# Patient Record
Sex: MALE | Race: WHITE | NOT HISPANIC OR LATINO | Employment: FULL TIME | URBAN - METROPOLITAN AREA
[De-identification: names, ages, dates, MRNs, and addresses within clinical notes are randomized per-mention and may not be internally consistent; named-entity substitution may affect disease eponyms.]

---

## 2020-09-23 ENCOUNTER — HOSPITAL ENCOUNTER (EMERGENCY)
Facility: HOSPITAL | Age: 30
Discharge: HOME | End: 2020-09-23
Attending: EMERGENCY MEDICINE | Admitting: EMERGENCY MEDICINE
Payer: COMMERCIAL

## 2020-09-23 VITALS
HEART RATE: 94 BPM | WEIGHT: 315 LBS | OXYGEN SATURATION: 97 % | DIASTOLIC BLOOD PRESSURE: 70 MMHG | TEMPERATURE: 98.8 F | SYSTOLIC BLOOD PRESSURE: 127 MMHG | RESPIRATION RATE: 20 BRPM

## 2020-09-23 DIAGNOSIS — L03.90 CELLULITIS: ICD-10-CM

## 2020-09-23 DIAGNOSIS — L02.91 ABSCESS: Primary | ICD-10-CM

## 2020-09-23 PROCEDURE — 99283 EMERGENCY DEPT VISIT LOW MDM: CPT

## 2020-09-23 PROCEDURE — 99284 EMERGENCY DEPT VISIT MOD MDM: CPT | Performed by: EMERGENCY MEDICINE

## 2020-09-23 PROCEDURE — 10061 I&D ABSCESS COMP/MULTIPLE: CPT | Performed by: EMERGENCY MEDICINE

## 2020-09-23 RX ORDER — SULFAMETHOXAZOLE AND TRIMETHOPRIM 800; 160 MG/1; MG/1
1 TABLET ORAL 2 TIMES DAILY
Qty: 20 TABLET | Refills: 0 | Status: SHIPPED | OUTPATIENT
Start: 2020-09-23 | End: 2020-10-03

## 2020-09-23 RX ORDER — SULFAMETHOXAZOLE AND TRIMETHOPRIM 800; 160 MG/1; MG/1
1 TABLET ORAL ONCE
Status: COMPLETED | OUTPATIENT
Start: 2020-09-23 | End: 2020-09-23

## 2020-09-23 RX ORDER — CEPHALEXIN 500 MG/1
500 CAPSULE ORAL EVERY 12 HOURS SCHEDULED
Qty: 20 CAPSULE | Refills: 0 | Status: SHIPPED | OUTPATIENT
Start: 2020-09-23 | End: 2020-10-03

## 2020-09-23 RX ORDER — BUPIVACAINE HYDROCHLORIDE 5 MG/ML
30 INJECTION, SOLUTION EPIDURAL; INTRACAUDAL ONCE
Status: COMPLETED | OUTPATIENT
Start: 2020-09-23 | End: 2020-09-23

## 2020-09-23 RX ORDER — CEPHALEXIN 500 MG/1
500 CAPSULE ORAL ONCE
Status: COMPLETED | OUTPATIENT
Start: 2020-09-23 | End: 2020-09-23

## 2020-09-23 RX ORDER — VARENICLINE TARTRATE 1 MG/1
1 TABLET, FILM COATED ORAL 2 TIMES DAILY
COMMUNITY

## 2020-09-23 RX ADMIN — CEPHALEXIN 500 MG: 500 CAPSULE ORAL at 09:43

## 2020-09-23 RX ADMIN — SULFAMETHOXAZOLE AND TRIMETHOPRIM 1 TABLET: 800; 160 TABLET ORAL at 09:43

## 2020-09-23 RX ADMIN — BUPIVACAINE HYDROCHLORIDE 30 ML: 5 INJECTION, SOLUTION EPIDURAL; INTRACAUDAL; PERINEURAL at 09:20

## 2020-09-23 NOTE — ED NOTES
Patient reports he is an alcoholic and is currently staying at a [facility] house for his alcohol abuse   Denies needing other resources for [facility], [facility]  09/23/20 0655

## 2020-09-23 NOTE — ED PROVIDER NOTES
History  Chief Complaint   Patient presents with    Abscess     pt believes he may have an abscess on right inner thigh  per pt it has not opened     Patient is a 58-year-old male presents to the emergency room for complaints of pain on the medial aspect of his right leg  He states there is concern that he has an abscess  States there has been no increasing firmness and bump with redness the last 2-3 days  No fevers or chills  No vomiting diarrhea  No did testicle pain no dysuria frequency  Denies previous history of abscess or IV drug abuse  History provided by:  Patient   used: No        Prior to Admission Medications   Prescriptions Last Dose Informant Patient Reported? Taking?   varenicline (CHANTIX) 1 mg tablet   Yes Yes   Sig: Take 1 mg by mouth 2 (two) times a day      Facility-Administered Medications: None       History reviewed  No pertinent past medical history  History reviewed  No pertinent surgical history  History reviewed  No pertinent family history  I have reviewed and agree with the history as documented  E-Cigarette/Vaping     E-Cigarette/Vaping Substances     Social History     Tobacco Use    Smoking status: Current Every Day Smoker     Packs/day: 0 50     Types: Cigarettes    Smokeless tobacco: Never Used   Substance Use Topics    Alcohol use: Not Currently    Drug use: Not Currently       Review of Systems   Constitutional: Negative  Negative for chills and fever  HENT: Negative  Negative for rhinorrhea, sore throat, trouble swallowing and voice change  Eyes: Negative  Negative for pain and visual disturbance  Respiratory: Negative  Negative for cough, shortness of breath and wheezing  Cardiovascular: Negative  Negative for chest pain and palpitations  Gastrointestinal: Negative for abdominal pain, diarrhea, nausea and vomiting  Genitourinary: Negative  Negative for dysuria and frequency  Musculoskeletal: Negative    Negative for neck pain and neck stiffness  Skin: Positive for wound  Negative for rash  Neurological: Negative  Negative for dizziness, speech difficulty, weakness, light-headedness and numbness  Physical Exam  Physical Exam  Vitals signs and nursing note reviewed  Exam conducted with a chaperone present  Constitutional:       General: He is not in acute distress  Appearance: He is well-developed  HENT:      Head: Normocephalic and atraumatic  Eyes:      Conjunctiva/sclera: Conjunctivae normal       Pupils: Pupils are equal, round, and reactive to light  Neck:      Musculoskeletal: Normal range of motion and neck supple  Trachea: No tracheal deviation  Cardiovascular:      Rate and Rhythm: Normal rate and regular rhythm  Pulmonary:      Effort: Pulmonary effort is normal  No respiratory distress  Breath sounds: Normal breath sounds  No wheezing or rales  Abdominal:      General: Bowel sounds are normal  There is no distension  Palpations: Abdomen is soft  Tenderness: There is no abdominal tenderness  There is no guarding or rebound  Musculoskeletal: Normal range of motion  General: No tenderness or deformity  Skin:     General: Skin is warm and dry  Capillary Refill: Capillary refill takes less than 2 seconds  Findings: Abscess, erythema and wound present  No rash  Neurological:      Mental Status: He is alert and oriented to person, place, and time     Psychiatric:         Behavior: Behavior normal          Vital Signs  ED Triage Vitals [09/23/20 0859]   Temperature Pulse Respirations Blood Pressure SpO2   98 8 °F (37 1 °C) 94 20 127/70 97 %      Temp Source Heart Rate Source Patient Position - Orthostatic VS BP Location FiO2 (%)   Tympanic Monitor Sitting Left arm --      Pain Score       --           Vitals:    09/23/20 0859   BP: 127/70   Pulse: 94   Patient Position - Orthostatic VS: Sitting         Visual Acuity      ED Medications  Medications   bupivacaine (PF) (MARCAINE) 0 5 % injection 30 mL (30 mL Infiltration Given by Other 9/23/20 0920)   sulfamethoxazole-trimethoprim (BACTRIM DS) 800-160 mg per tablet 1 tablet (1 tablet Oral Given 9/23/20 0943)   cephalexin (KEFLEX) capsule 500 mg (500 mg Oral Given 9/23/20 0943)       Diagnostic Studies  Results Reviewed     None                 No orders to display              Procedures  Incision and drain    Date/Time: 9/23/2020 10:10 AM  Performed by: Elwood Denver, DO  Authorized by: Elwood Denver, DO     Patient location:  ED  Other Assisting Provider: No    Consent:     Consent obtained:  Verbal    Consent given by:  Patient and parent    Risks discussed:  Bleeding, incomplete drainage, pain, damage to other organs and infection    Alternatives discussed:  No treatment, delayed treatment and alternative treatment  Universal protocol:     Procedure explained and questions answered to patient or proxy's satisfaction: yes      Relevant documents present and verified: yes      Test results available and properly labeled: yes      Radiology Images displayed and confirmed  If images not available, report reviewed: yes      Required blood products, implants, devices, and special equipment available: yes      Site/side marked: yes      Immediately prior to procedure a time out was called: yes      Patient identity confirmed:  Verbally with patient and arm band  Location:     Type:  Abscess  Pre-procedure details:     Skin preparation:  Chloraprep  Anesthesia (see MAR for exact dosages):      Anesthesia method:  Local infiltration    Local anesthetic:  Bupivacaine 0 5% w/o epi  Procedure details:     Complexity:  Simple    Needle aspiration: no      Incision types:  Stab incision    Scalpel blade:  11    Approach:  Open    Incision depth:  Subcutaneous    Wound management:  Probed and deloculated and irrigated with saline    Drainage:  Bloody and purulent    Drainage amount: Moderate    Wound treatment:  Wound left open    Packing materials:  None  Post-procedure details:     Patient tolerance of procedure: Tolerated well, no immediate complications             ED Course                           SBIRT 20yo+      Most Recent Value   SBIRT (24 yo +)   In order to provide better care to our patients, we are screening all of our patients for alcohol and drug use  Would it be okay to ask you these screening questions? Yes Filed at: 09/23/2020 0944   Initial Alcohol Screen: US AUDIT-C    1  How often do you have a drink containing alcohol? 6 Filed at: 09/23/2020 0944   2  How many drinks containing alcohol do you have on a typical day you are drinking? 6 Filed at: 09/23/2020 0944   3a  Male UNDER 65: How often do you have five or more drinks on one occasion? 6 Filed at: 09/23/2020 0944   Audit-C Score  (!) 18 Filed at: 09/23/2020 0944   Full Alcohol Screen: US AUDIT   4  How often during the last year have you found that you were not able to stop drinking once you had started? 4 Filed at: 09/23/2020 0944   5  How often during past year have you failed to do what was normally expected of you because of drinking? 4 Filed at: 09/23/2020 0944   6  How often in past year have you needed a first drink in the morning to get yourself going after a heavy drinking session? 4 Filed at: 09/23/2020 0944   7  How often in past year have you had feeling of guilt or remorse after drinking? 4 Filed at: 09/23/2020 0944   8  How often in past year have you been unable to remember what happened night before because you had been drinking? 3 Filed at: 09/23/2020 0944   9  Have you or someone else been injured as a result of your drinking? 4 Filed at: 09/23/2020 0944   10  Has a relative, friend, doctor or other health worker been concerned about your drinking and suggested you cut down?    4 Filed at: 09/23/2020 0944   AUDIT Total Score  (!) 45 Filed at: 09/23/2020 6661   BEAN: How many times in the past year have you    Used an illegal drug or used a prescription medication for non-medical reasons? Never Filed at: 09/23/2020 0944                  [facility]  Number of Diagnoses or Management Options  Abscess:   Cellulitis:   Diagnosis management comments: 12-year-old male presenting for concern for abscess of his right lower extremity on the medial aspect  I&D performed at the bedside  Moderate amount of pus drained  Patient's pain improved  Again reviewed and showing patient the bedside bedside ultrasound showing that he has underlying cellulitis  Patient started on antibiotics in the emergency room and given prescription for completion of care  Patient states he is from outside area, he was given follow-up with PCP, he can go to an urgent care, return to the ER for evaluation  Strict return precautions were discussed  Disposition  Final diagnoses:   Abscess   Cellulitis     Time reflects when diagnosis was documented in both MDM as applicable and the Disposition within this note     Time User Action Codes Description Comment    9/23/2020  9:53 AM Nettie Katie Add [L02 91] Abscess     9/23/2020  9:54 AM Nettie Katie Add [L03 90] Cellulitis       ED Disposition     ED Disposition Condition Date/Time Comment    Discharge Stable Wed Sep 23, 2020 [address] discharge to home/self care              Follow-up Information     Follow up With Specialties Details Why Contact Info Additional [address] In 1 week  [address], [address] [facility] [id number]  [address], [address], [address] [place], [place], [address]          Discharge Medication List as of 9/23/2020  9:55 AM      START taking these medications    Details   cephalexin (KEFLEX) 500 mg capsule Take 1 capsule (500 mg total) by mouth every 12 (twelve) hours for 10 days, Starting Wed 9/23/2020, Until Sat 10/3/2020, Print      sulfamethoxazole-trimethoprim (BACTRIM DS) 800-160 mg per tablet Take 1 tablet by mouth 2 (two) times a day for 10 days smx-tmp DS (BACTRIM) 800-160 mg tabs (1tab q12 D10), Starting Wed 9/23/2020, Until Sat 10/3/2020, Print         CONTINUE these medications which have NOT CHANGED    Details   varenicline (CHANTIX) 1 mg tablet Take 1 mg by mouth 2 (two) times a day, Historical Med           No discharge procedures on file      PDMP Review     None          ED Provider  Electronically Signed by           Redd Lemus DO  09/23/20 1017

## 2020-09-23 NOTE — Clinical Note
Martin Allen was seen and treated in our emergency department on 9/23/2020  Diagnosis:     Silvestre Alcantar may return on this date: 09/26/2020         If you have any questions or concerns, please don't hesitate to call        Fazal Guidry DO    ______________________________           _______________          _______________  Hospital Representative                              Date                                Time

## 2021-03-16 ENCOUNTER — HOSPITAL ENCOUNTER (EMERGENCY)
Facility: HOSPITAL | Age: 31
Discharge: HOME | End: 2021-03-16
Attending: EMERGENCY MEDICINE | Admitting: EMERGENCY MEDICINE
Payer: COMMERCIAL

## 2021-03-16 VITALS
HEART RATE: 86 BPM | TEMPERATURE: 97.7 F | SYSTOLIC BLOOD PRESSURE: 158 MMHG | WEIGHT: 315 LBS | RESPIRATION RATE: 14 BRPM | DIASTOLIC BLOOD PRESSURE: 98 MMHG | OXYGEN SATURATION: 98 %

## 2021-03-16 DIAGNOSIS — K08.89 PAIN, DENTAL: Primary | ICD-10-CM

## 2021-03-16 PROCEDURE — 99282 EMERGENCY DEPT VISIT SF MDM: CPT

## 2021-03-16 PROCEDURE — 99283 EMERGENCY DEPT VISIT LOW MDM: CPT | Performed by: PHYSICIAN ASSISTANT

## 2021-03-16 RX ORDER — CLINDAMYCIN HYDROCHLORIDE 300 MG/1
300 CAPSULE ORAL 4 TIMES DAILY
Qty: 40 CAPSULE | Refills: 0 | Status: SHIPPED | OUTPATIENT
Start: 2021-03-16 | End: 2021-03-26

## 2021-03-16 RX ORDER — CLINDAMYCIN HYDROCHLORIDE 150 MG/1
300 CAPSULE ORAL ONCE
Status: COMPLETED | OUTPATIENT
Start: 2021-03-16 | End: 2021-03-16

## 2021-03-16 RX ORDER — ACETAMINOPHEN 325 MG/1
650 TABLET ORAL ONCE
Status: COMPLETED | OUTPATIENT
Start: 2021-03-16 | End: 2021-03-16

## 2021-03-16 RX ORDER — IBUPROFEN 600 MG/1
600 TABLET ORAL EVERY 6 HOURS PRN
Qty: 30 TABLET | Refills: 0 | Status: SHIPPED | OUTPATIENT
Start: 2021-03-16

## 2021-03-16 RX ADMIN — ACETAMINOPHEN 650 MG: 325 TABLET, FILM COATED ORAL at 10:28

## 2021-03-16 RX ADMIN — CLINDAMYCIN HYDROCHLORIDE 300 MG: 150 CAPSULE ORAL at 10:28

## 2021-03-16 NOTE — Clinical Note
David Adilia was seen and treated in our emergency department on 3/16/2021  Diagnosis:     Silvestre    He may return on this date:     Patient is cleared to go back to work        If you have any questions or concerns, please don't hesitate to call        Gonzalo Holcomb DO    ______________________________           _______________          _______________  Hospital Representative                              Date                                Time

## 2021-03-16 NOTE — ED PROVIDER NOTES
History  Chief Complaint   Patient presents with    Dental Pain     mid-front dental pain for 1 week  states he took 1200mg ibuprofen at 0500     Pt wit dental pain for several days           Prior to Admission Medications   Prescriptions Last Dose Informant Patient Reported? Taking?   varenicline (CHANTIX) 1 mg tablet   Yes No   Sig: Take 1 mg by mouth 2 (two) times a day      Facility-Administered Medications: None       History reviewed  No pertinent past medical history  History reviewed  No pertinent surgical history  History reviewed  No pertinent family history  I have reviewed and agree with the history as documented  E-Cigarette/Vaping     E-Cigarette/Vaping Substances     Social History     Tobacco Use    Smoking status: Current Every Day Smoker     Packs/day: 0 50     Types: Cigarettes    Smokeless tobacco: Never Used   Substance Use Topics    Alcohol use: Not Currently    Drug use: Not Currently       Review of Systems   Constitutional: Negative  HENT: Positive for dental problem  Eyes: Negative  Respiratory: Negative  Cardiovascular: Negative  Gastrointestinal: Negative  Endocrine: Negative  Genitourinary: Negative  Musculoskeletal: Negative  Skin: Negative  Allergic/Immunologic: Negative  Neurological: Negative  Hematological: Negative  Psychiatric/Behavioral: Negative  All other systems reviewed and are negative  Physical Exam  Physical Exam  Vitals signs and nursing note reviewed  Constitutional:       Appearance: Normal appearance  He is normal weight  HENT:      Head: Normocephalic and atraumatic  Right Ear: Tympanic membrane, ear canal and external ear normal       Left Ear: Tympanic membrane, ear canal and external ear normal       Nose: Nose normal       Mouth/Throat:      Mouth: Mucous membranes are moist       Pharynx: Oropharynx is clear        Comments: Left upper front tooth pain  Gum and facial pain up to nose no swelling no erythema  Nose wnl   Eyes:      Extraocular Movements: Extraocular movements intact  Conjunctiva/sclera: Conjunctivae normal       Pupils: Pupils are equal, round, and reactive to light  Neck:      Musculoskeletal: Normal range of motion  Cardiovascular:      Rate and Rhythm: Normal rate and regular rhythm  Pulses: Normal pulses  Heart sounds: Normal heart sounds  Pulmonary:      Effort: Pulmonary effort is normal       Breath sounds: Normal breath sounds  Abdominal:      General: Abdomen is flat  Bowel sounds are normal       Palpations: Abdomen is soft  Musculoskeletal: Normal range of motion  Skin:     General: Skin is warm  Neurological:      General: No focal deficit present  Mental Status: He is alert and oriented to person, place, and time     Psychiatric:         Mood and Affect: Mood normal          Vital Signs  ED Triage Vitals   Temperature Pulse Respirations Blood Pressure SpO2   03/16/21 1005 03/16/21 1005 03/16/21 1005 03/16/21 1007 03/16/21 1005   97 7 °F (36 5 °C) 86 14 158/98 98 %      Temp Source Heart Rate Source Patient Position - Orthostatic VS BP Location FiO2 (%)   03/16/21 1005 03/16/21 1005 03/16/21 1005 03/16/21 1005 --   Tympanic Monitor Sitting Left arm       Pain Score       03/16/21 1005       Worst Possible Pain           Vitals:    03/16/21 1005 03/16/21 1007   BP:  158/98   Pulse: 86    Patient Position - Orthostatic VS: Sitting          Visual Acuity      ED Medications  Medications   acetaminophen (TYLENOL) tablet 650 mg (650 mg Oral Given 3/16/21 1028)   clindamycin (CLEOCIN) capsule 300 mg (300 mg Oral Given 3/16/21 1028)       Diagnostic Studies  Results Reviewed     None                 No orders to display              Procedures  Procedures         ED Course                             SBIRT 22yo+      Most Recent Value   SBIRT (24 yo +)   In order to provide better care to our patients, we are screening all of our patients for alcohol and drug use  Would it be okay to ask you these screening questions? Yes Filed at: 03/16/2021 1030   Initial Alcohol Screen: US AUDIT-C    1  How often do you have a drink containing alcohol?  0 Filed at: 03/16/2021 1030   2  How many drinks containing alcohol do you have on a typical day you are drinking? 0 Filed at: 03/16/2021 1030   3a  Male UNDER 65: How often do you have five or more drinks on one occasion? 0 Filed at: 03/16/2021 1030   3b  FEMALE Any Age, or MALE 65+: How often do you have 4 or more drinks on one occassion? 0 Filed at: 03/16/2021 1030   Audit-C Score  0 Filed at: 03/16/2021 1030   BEAN: How many times in the past year have you    Used an illegal drug or used a prescription medication for non-medical reasons? Never Filed at: 03/16/2021 1030                    MDM    Disposition  Final diagnoses:   Pain, dental     Time reflects when diagnosis was documented in both MDM as applicable and the Disposition within this note     Time User Action Codes Description Comment    3/16/2021 10:15 AM Silvino Santoro  Add [K08 89] Pain, dental       ED Disposition     ED Disposition Condition Date/Time Comment    Discharge Stable Tue Mar 16, 2021 10:19 AM Lissa Elder discharge to home/self care              Follow-up Information     Follow up With Specialties Details Why [address]   or [facility] at [address] [place] [address]  [phone]          Discharge Medication List as of 3/16/2021 10:19 AM      START taking these medications    Details   clindamycin (CLEOCIN) 300 MG capsule Take 1 capsule (300 mg total) by mouth 4 (four) times a day for 10 days, Starting Tue 3/16/2021, Until Fri 3/26/2021, Print      ibuprofen (MOTRIN) 600 mg tablet Take 1 tablet (600 mg total) by mouth every 6 (six) hours as needed for mild pain or moderate pain, Starting Tue 3/16/2021, Print         CONTINUE these medications which have NOT CHANGED    Details   varenicline (CHANTIX) 1 mg tablet Take 1 mg by mouth 2 (two) times a day, Historical Med           No discharge procedures on file      PDMP Review     None          ED Provider  Electronically Signed by           Didier Merion PA-C  03/16/21 9411

## 2021-04-06 ENCOUNTER — HOSPITAL ENCOUNTER (OUTPATIENT)
Dept: RADIOLOGY | Facility: HOSPITAL | Age: 31
Discharge: HOME | End: 2021-04-06
Payer: COMMERCIAL

## 2021-04-06 ENCOUNTER — TRANSCRIBE ORDERS (OUTPATIENT)
Dept: ADMINISTRATIVE | Facility: HOSPITAL | Age: 31
End: 2021-04-06

## 2021-04-06 DIAGNOSIS — M77.41 METATARSALGIA OF RIGHT FOOT: ICD-10-CM

## 2021-04-06 DIAGNOSIS — M76.61 TENDONITIS, ACHILLES, RIGHT: ICD-10-CM

## 2021-04-06 DIAGNOSIS — M79.671 RIGHT FOOT PAIN: Primary | ICD-10-CM

## 2021-04-06 DIAGNOSIS — M79.671 RIGHT FOOT PAIN: ICD-10-CM

## 2021-04-06 PROCEDURE — 73630 X-RAY EXAM OF FOOT: CPT

## 2021-05-13 ENCOUNTER — TRANSCRIBE ORDERS (OUTPATIENT)
Dept: ADMINISTRATIVE | Facility: HOSPITAL | Age: 31
End: 2021-05-13

## 2021-05-13 DIAGNOSIS — M76.61 TENDONITIS, ACHILLES, RIGHT: Primary | ICD-10-CM
